# Patient Record
Sex: MALE | Race: WHITE | URBAN - METROPOLITAN AREA
[De-identification: names, ages, dates, MRNs, and addresses within clinical notes are randomized per-mention and may not be internally consistent; named-entity substitution may affect disease eponyms.]

---

## 2020-09-04 ENCOUNTER — WEB ENCOUNTER (OUTPATIENT)
Dept: URBAN - METROPOLITAN AREA CLINIC 86 | Facility: CLINIC | Age: 60
End: 2020-09-04

## 2020-09-28 ENCOUNTER — OFFICE VISIT (OUTPATIENT)
Dept: URBAN - METROPOLITAN AREA CLINIC 98 | Facility: CLINIC | Age: 60
End: 2020-09-28

## 2020-10-12 ENCOUNTER — LAB OUTSIDE AN ENCOUNTER (OUTPATIENT)
Dept: URBAN - METROPOLITAN AREA CLINIC 98 | Facility: CLINIC | Age: 60
End: 2020-10-12

## 2020-10-13 LAB
A/G RATIO: 2.2
ALBUMIN: 4.7
ALKALINE PHOSPHATASE: 50
ALT (SGPT): 36
AST (SGOT): 35
BASO (ABSOLUTE): 0.1
BASOS: 1
BILIRUBIN, TOTAL: 0.7
BUN/CREATININE RATIO: 14
BUN: 17
CALCIUM: 9.8
CARBON DIOXIDE, TOTAL: 25
CHLORIDE: 102
CREATININE: 1.21
EGFR IF AFRICN AM: 74
EGFR IF NONAFRICN AM: 64
EOS (ABSOLUTE): 0.2
EOS: 3
GGT: 55
GLOBULIN, TOTAL: 2.1
GLUCOSE: 108
HEMATOCRIT: 42.8
HEMATOLOGY COMMENTS:: (no result)
HEMOGLOBIN: 14.8
IMMATURE CELLS: (no result)
IMMATURE GRANS (ABS): 0
IMMATURE GRANULOCYTES: 0
INR: 1
LYMPHS (ABSOLUTE): 2.2
LYMPHS: 33
MCH: 31.4
MCHC: 34.6
MCV: 91
MONOCYTES(ABSOLUTE): 0.7
MONOCYTES: 10
NEUTROPHILS (ABSOLUTE): 3.6
NEUTROPHILS: 53
NRBC: (no result)
PLATELETS: 206
POTASSIUM: 4.9
PROTEIN, TOTAL: 6.8
PROTHROMBIN TIME: 10.7
RBC: 4.72
RDW: 11.9
SODIUM: 139
WBC: 6.9

## 2020-10-19 ENCOUNTER — OFFICE VISIT (OUTPATIENT)
Dept: URBAN - METROPOLITAN AREA CLINIC 98 | Facility: CLINIC | Age: 60
End: 2020-10-19

## 2020-10-27 ENCOUNTER — OFFICE VISIT (OUTPATIENT)
Dept: URBAN - METROPOLITAN AREA CLINIC 98 | Facility: CLINIC | Age: 60
End: 2020-10-27
Payer: COMMERCIAL

## 2020-10-27 DIAGNOSIS — K70.0 ALCOHOL INDUCED FATTY LIVER: ICD-10-CM

## 2020-10-27 PROCEDURE — G9903 PT SCRN TBCO ID AS NON USER: HCPCS | Performed by: INTERNAL MEDICINE

## 2020-10-27 PROCEDURE — G8482 FLU IMMUNIZE ORDER/ADMIN: HCPCS | Performed by: INTERNAL MEDICINE

## 2020-10-27 PROCEDURE — G8420 CALC BMI NORM PARAMETERS: HCPCS | Performed by: INTERNAL MEDICINE

## 2020-10-27 PROCEDURE — G8427 DOCREV CUR MEDS BY ELIG CLIN: HCPCS | Performed by: INTERNAL MEDICINE

## 2020-10-27 PROCEDURE — 3017F COLORECTAL CA SCREEN DOC REV: CPT | Performed by: INTERNAL MEDICINE

## 2020-10-27 PROCEDURE — 99213 OFFICE O/P EST LOW 20 MIN: CPT | Performed by: INTERNAL MEDICINE

## 2020-10-27 RX ORDER — DICLOFENAC SODIUM 75 MG/1
TAKE 1 TABLET (75 MG) BY ORAL ROUTE 2 TIMES PER DAY TABLET, DELAYED RELEASE ORAL 2
Qty: 0 | Refills: 0 | Status: ACTIVE | COMMUNITY
Start: 1900-01-01

## 2020-10-27 RX ORDER — AZILSARTAN KAMEDOXOMIL 80 MG/1
TAKE 1 TABLET (80 MG) BY ORAL ROUTE ONCE DAILY TABLET ORAL 1
Qty: 0 | Refills: 0 | Status: ACTIVE | COMMUNITY
Start: 1900-01-01

## 2020-10-27 RX ORDER — TRIAMCINOLONE ACETONIDE 55 UG/1
APPLY 2 SPRAYS BY NASAL ROUTE SPRAY, METERED NASAL
Qty: 1 | Refills: 0 | Status: ACTIVE | COMMUNITY
Start: 1900-01-01

## 2020-10-27 RX ORDER — OMEPRAZOLE 40 MG/1
TAKE 1 CAPSULE (40 MG) BY ORAL ROUTE ONCE DAILY BEFORE A MEAL CAPSULE, DELAYED RELEASE PELLETS ORAL 1
Qty: 0 | Refills: 0 | Status: ACTIVE | COMMUNITY
Start: 1900-01-01

## 2020-10-27 RX ORDER — ASPIRIN 81 MG/1
TAKE 1 TABLET (81 MG) BY ORAL ROUTE ONCE DAILY TABLET, COATED ORAL 1
Qty: 0 | Refills: 0 | Status: ACTIVE | COMMUNITY
Start: 1900-01-01

## 2020-10-27 NOTE — HPI-TODAY'S VISIT:
here to follow up - still drinking alcohol - although not as much as previously - 1-2 drinks wine / night  on weeknights - more on weekends.  generally feels well.  did have shoulder surgery but now completely off diclofenac.

## 2021-10-12 ENCOUNTER — OFFICE VISIT (OUTPATIENT)
Dept: URBAN - METROPOLITAN AREA CLINIC 98 | Facility: CLINIC | Age: 61
End: 2021-10-12

## 2021-10-26 ENCOUNTER — OFFICE VISIT (OUTPATIENT)
Dept: URBAN - METROPOLITAN AREA CLINIC 98 | Facility: CLINIC | Age: 61
End: 2021-10-26

## 2021-11-02 ENCOUNTER — TELEPHONE ENCOUNTER (OUTPATIENT)
Dept: URBAN - METROPOLITAN AREA CLINIC 98 | Facility: CLINIC | Age: 61
End: 2021-11-02

## 2021-11-03 LAB
A/G RATIO: 2
ALBUMIN: 4.5
ALKALINE PHOSPHATASE: 51
ALT (SGPT): 44
AST (SGOT): 41
BASO (ABSOLUTE): 0.1
BASOS: 1
BILIRUBIN, TOTAL: 0.4
BUN/CREATININE RATIO: 14
BUN: 17
CALCIUM: 8.8
CARBON DIOXIDE, TOTAL: 25
CHLORIDE: 102
CREATININE: 1.18
EGFR IF AFRICN AM: 75
EGFR IF NONAFRICN AM: 65
EOS (ABSOLUTE): 0.4
EOS: 6
GGT: 72
GLOBULIN, TOTAL: 2.2
GLUCOSE: 96
HEMATOCRIT: 42.1
HEMATOLOGY COMMENTS:: (no result)
HEMOGLOBIN: 14.3
IMMATURE CELLS: (no result)
IMMATURE GRANS (ABS): 0
IMMATURE GRANULOCYTES: 0
LYMPHS (ABSOLUTE): 2
LYMPHS: 30
MCH: 30.8
MCHC: 34
MCV: 91
MONOCYTES(ABSOLUTE): 0.7
MONOCYTES: 11
NEUTROPHILS (ABSOLUTE): 3.5
NEUTROPHILS: 52
NRBC: (no result)
PLATELETS: 222
POTASSIUM: 4.5
PROTEIN, TOTAL: 6.7
RBC: 4.64
RDW: 11.9
SODIUM: 141
WBC: 6.7

## 2021-11-08 ENCOUNTER — OFFICE VISIT (OUTPATIENT)
Dept: URBAN - METROPOLITAN AREA CLINIC 98 | Facility: CLINIC | Age: 61
End: 2021-11-08
Payer: COMMERCIAL

## 2021-11-08 ENCOUNTER — LAB OUTSIDE AN ENCOUNTER (OUTPATIENT)
Dept: URBAN - METROPOLITAN AREA CLINIC 98 | Facility: CLINIC | Age: 61
End: 2021-11-08

## 2021-11-08 ENCOUNTER — WEB ENCOUNTER (OUTPATIENT)
Dept: URBAN - METROPOLITAN AREA CLINIC 98 | Facility: CLINIC | Age: 61
End: 2021-11-08

## 2021-11-08 VITALS
DIASTOLIC BLOOD PRESSURE: 93 MMHG | BODY MASS INDEX: 32.46 KG/M2 | SYSTOLIC BLOOD PRESSURE: 165 MMHG | HEART RATE: 73 BPM | TEMPERATURE: 97 F | HEIGHT: 67 IN | WEIGHT: 206.8 LBS

## 2021-11-08 DIAGNOSIS — K70.0 ALCOHOL INDUCED FATTY LIVER: ICD-10-CM

## 2021-11-08 PROCEDURE — 99213 OFFICE O/P EST LOW 20 MIN: CPT | Performed by: INTERNAL MEDICINE

## 2021-11-08 RX ORDER — ASPIRIN 81 MG/1
TAKE 1 TABLET (81 MG) BY ORAL ROUTE ONCE DAILY TABLET, COATED ORAL 1
Qty: 0 | Refills: 0 | Status: ACTIVE | COMMUNITY
Start: 1900-01-01

## 2021-11-08 RX ORDER — AZILSARTAN KAMEDOXOMIL 80 MG/1
TAKE 1 TABLET (80 MG) BY ORAL ROUTE ONCE DAILY TABLET ORAL 1
Qty: 0 | Refills: 0 | Status: ACTIVE | COMMUNITY
Start: 1900-01-01

## 2021-11-08 RX ORDER — DICLOFENAC SODIUM 75 MG/1
TAKE 1 TABLET (75 MG) BY ORAL ROUTE 2 TIMES PER DAY TABLET, DELAYED RELEASE ORAL 2
Qty: 0 | Refills: 0 | Status: ACTIVE | COMMUNITY
Start: 1900-01-01

## 2021-11-08 RX ORDER — OMEPRAZOLE 40 MG/1
TAKE 1 CAPSULE (40 MG) BY ORAL ROUTE ONCE DAILY BEFORE A MEAL CAPSULE, DELAYED RELEASE PELLETS ORAL 1
Qty: 0 | Refills: 0 | Status: ACTIVE | COMMUNITY
Start: 1900-01-01

## 2021-11-08 RX ORDER — TRIAMCINOLONE ACETONIDE 55 UG/1
APPLY 2 SPRAYS BY NASAL ROUTE SPRAY, METERED NASAL
Qty: 1 | Refills: 0 | Status: ACTIVE | COMMUNITY
Start: 1900-01-01

## 2021-11-08 NOTE — HPI-OTHER HISTORIES
in [place] all summer.  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   no new health issues.   has had high BP. drinking alcohol - a little more this year

## 2022-09-28 ENCOUNTER — WEB ENCOUNTER (OUTPATIENT)
Dept: URBAN - METROPOLITAN AREA CLINIC 98 | Facility: CLINIC | Age: 62
End: 2022-09-28

## 2022-11-04 ENCOUNTER — LAB OUTSIDE AN ENCOUNTER (OUTPATIENT)
Dept: URBAN - METROPOLITAN AREA CLINIC 98 | Facility: CLINIC | Age: 62
End: 2022-11-04

## 2022-11-05 LAB
A/G RATIO: 2.1
ALBUMIN: 4.2
ALKALINE PHOSPHATASE: 56
ALT (SGPT): 86
AST (SGOT): 72
BASO (ABSOLUTE): 0.1
BASOS: 1
BILIRUBIN, TOTAL: 0.4
BUN/CREATININE RATIO: 16
BUN: 17
CALCIUM: 8.7
CARBON DIOXIDE, TOTAL: 20
CHLORIDE: 101
CREATININE: 1.07
EGFR: 77
EOS (ABSOLUTE): 0.5
EOS: 8
FERRITIN, SERUM: 729
GGT: 73
GLOBULIN, TOTAL: 2
GLUCOSE: 104
HEMATOCRIT: 40.3
HEMATOLOGY COMMENTS:: (no result)
HEMOGLOBIN: 13.5
IMMATURE CELLS: (no result)
IMMATURE GRANS (ABS): 0
IMMATURE GRANULOCYTES: 0
IRON BIND.CAP.(TIBC): 281
IRON SATURATION: 39
IRON: 109
LYMPHS (ABSOLUTE): 1.9
LYMPHS: 33
MCH: 30.6
MCHC: 33.5
MCV: 91
MONOCYTES(ABSOLUTE): 0.7
MONOCYTES: 12
NEUTROPHILS (ABSOLUTE): 2.6
NEUTROPHILS: 46
NRBC: (no result)
PLATELETS: 197
POTASSIUM: 4.3
PROTEIN, TOTAL: 6.2
RBC: 4.41
RDW: 11.9
SODIUM: 138
UIBC: 172
WBC: 5.7

## 2022-11-08 ENCOUNTER — LAB OUTSIDE AN ENCOUNTER (OUTPATIENT)
Dept: URBAN - METROPOLITAN AREA CLINIC 98 | Facility: CLINIC | Age: 62
End: 2022-11-08

## 2022-11-11 ENCOUNTER — OFFICE VISIT (OUTPATIENT)
Dept: URBAN - METROPOLITAN AREA CLINIC 98 | Facility: CLINIC | Age: 62
End: 2022-11-11
Payer: COMMERCIAL

## 2022-11-11 VITALS
WEIGHT: 189.4 LBS | HEIGHT: 67 IN | HEART RATE: 71 BPM | BODY MASS INDEX: 29.73 KG/M2 | TEMPERATURE: 97.9 F | DIASTOLIC BLOOD PRESSURE: 85 MMHG | SYSTOLIC BLOOD PRESSURE: 152 MMHG

## 2022-11-11 DIAGNOSIS — K70.0 ALCOHOL INDUCED FATTY LIVER: ICD-10-CM

## 2022-11-11 PROCEDURE — 99213 OFFICE O/P EST LOW 20 MIN: CPT | Performed by: INTERNAL MEDICINE

## 2022-11-11 RX ORDER — AZILSARTAN KAMEDOXOMIL 80 MG/1
TAKE 1 TABLET (80 MG) BY ORAL ROUTE ONCE DAILY TABLET ORAL 1
Qty: 0 | Refills: 0 | Status: ACTIVE | COMMUNITY
Start: 1900-01-01

## 2022-11-11 RX ORDER — OMEPRAZOLE 40 MG/1
TAKE 1 CAPSULE (40 MG) BY ORAL ROUTE ONCE DAILY BEFORE A MEAL CAPSULE, DELAYED RELEASE PELLETS ORAL 1
Qty: 0 | Refills: 0 | Status: ACTIVE | COMMUNITY
Start: 1900-01-01

## 2022-11-11 RX ORDER — TRIAMCINOLONE ACETONIDE 55 UG/1
APPLY 2 SPRAYS BY NASAL ROUTE SPRAY, METERED NASAL
Qty: 1 | Refills: 0 | Status: ACTIVE | COMMUNITY
Start: 1900-01-01

## 2022-11-11 RX ORDER — TIZANIDINE 4 MG/1
1 TABLET AS NEEDED TABLET ORAL THREE TIMES A DAY
Status: ACTIVE | COMMUNITY

## 2022-11-11 RX ORDER — ASPIRIN 81 MG/1
TAKE 1 TABLET (81 MG) BY ORAL ROUTE ONCE DAILY TABLET, COATED ORAL 1
Qty: 0 | Refills: 0 | Status: ACTIVE | COMMUNITY
Start: 1900-01-01

## 2022-11-11 RX ORDER — DICLOFENAC SODIUM 75 MG/1
TAKE 1 TABLET (75 MG) BY ORAL ROUTE 2 TIMES PER DAY TABLET, DELAYED RELEASE ORAL 2
Qty: 0 | Refills: 0 | Status: ON HOLD | COMMUNITY
Start: 1900-01-01
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
## 2022-12-07 ENCOUNTER — WEB ENCOUNTER (OUTPATIENT)
Dept: URBAN - METROPOLITAN AREA CLINIC 98 | Facility: CLINIC | Age: 62
End: 2022-12-07

## 2022-12-08 ENCOUNTER — TELEPHONE ENCOUNTER (OUTPATIENT)
Dept: URBAN - METROPOLITAN AREA CLINIC 98 | Facility: CLINIC | Age: 62
End: 2022-12-08

## 2023-02-05 ENCOUNTER — WEB ENCOUNTER (OUTPATIENT)
Dept: URBAN - METROPOLITAN AREA CLINIC 98 | Facility: CLINIC | Age: 63
End: 2023-02-05

## 2023-05-03 ENCOUNTER — LAB OUTSIDE AN ENCOUNTER (OUTPATIENT)
Dept: URBAN - METROPOLITAN AREA CLINIC 98 | Facility: CLINIC | Age: 63
End: 2023-05-03

## 2023-05-08 LAB
A/G RATIO: 2.4
ALBUMIN: 4.5
ALKALINE PHOSPHATASE: 48
ALT (SGPT): 47
AST (SGOT): 46
BASO (ABSOLUTE): 0.1
BASOS: 1
BILIRUBIN, TOTAL: 0.7
BUN/CREATININE RATIO: 16
BUN: 16
CALCIUM: 9.8
CARBON DIOXIDE, TOTAL: 25
CHLORIDE: 99
CREATININE: 1.01
EGFR: 83
ELF(TM) SCORE: 10.19
EOS (ABSOLUTE): 0.3
EOS: 5
FERRITIN, SERUM: 447
GGT: 56
GLOBULIN, TOTAL: 1.9
GLUCOSE: 103
HEMATOCRIT: 44.4
HEMATOLOGY COMMENTS:: (no result)
HEMOGLOBIN: 15
IMMATURE CELLS: (no result)
IMMATURE GRANS (ABS): 0
IMMATURE GRANULOCYTES: 0
IRON BIND.CAP.(TIBC): 288
IRON SATURATION: 40
IRON: 115
LYMPHS (ABSOLUTE): 2.2
LYMPHS: 37
MCH: 31.2
MCHC: 33.8
MCV: 92
MONOCYTES(ABSOLUTE): 0.6
MONOCYTES: 10
NEUTROPHILS (ABSOLUTE): 2.7
NEUTROPHILS: 47
NRBC: (no result)
PLATELETS: 193
POTASSIUM: 5.1
PROTEIN, TOTAL: 6.4
RBC: 4.81
RDW: 12.1
SODIUM: 137
UIBC: 173
WBC: 5.9

## 2023-05-11 ENCOUNTER — LAB OUTSIDE AN ENCOUNTER (OUTPATIENT)
Dept: URBAN - METROPOLITAN AREA CLINIC 98 | Facility: CLINIC | Age: 63
End: 2023-05-11

## 2023-05-11 ENCOUNTER — OFFICE VISIT (OUTPATIENT)
Dept: URBAN - METROPOLITAN AREA CLINIC 98 | Facility: CLINIC | Age: 63
End: 2023-05-11
Payer: COMMERCIAL

## 2023-05-11 VITALS
BODY MASS INDEX: 31.67 KG/M2 | HEART RATE: 73 BPM | DIASTOLIC BLOOD PRESSURE: 88 MMHG | TEMPERATURE: 97.1 F | SYSTOLIC BLOOD PRESSURE: 141 MMHG | WEIGHT: 201.8 LBS | HEIGHT: 67 IN

## 2023-05-11 DIAGNOSIS — K70.0 ALCOHOL INDUCED FATTY LIVER: ICD-10-CM

## 2023-05-11 PROCEDURE — 99214 OFFICE O/P EST MOD 30 MIN: CPT | Performed by: INTERNAL MEDICINE

## 2023-05-11 RX ORDER — OMEPRAZOLE 40 MG/1
TAKE 1 CAPSULE (40 MG) BY ORAL ROUTE ONCE DAILY BEFORE A MEAL CAPSULE, DELAYED RELEASE PELLETS ORAL 1
Qty: 0 | Refills: 0 | Status: ACTIVE | COMMUNITY
Start: 1900-01-01

## 2023-05-11 RX ORDER — DICLOFENAC SODIUM 75 MG/1
TAKE 1 TABLET (75 MG) BY ORAL ROUTE 2 TIMES PER DAY TABLET, DELAYED RELEASE ORAL 2
Qty: 0 | Refills: 0 | Status: ON HOLD | COMMUNITY
Start: 1900-01-01

## 2023-05-11 RX ORDER — TRIAMCINOLONE ACETONIDE 55 UG/1
APPLY 2 SPRAYS BY NASAL ROUTE SPRAY, METERED NASAL
Qty: 1 | Refills: 0 | Status: ACTIVE | COMMUNITY
Start: 1900-01-01

## 2023-05-11 RX ORDER — AZILSARTAN KAMEDOXOMIL 80 MG/1
TAKE 1 TABLET (80 MG) BY ORAL ROUTE ONCE DAILY TABLET ORAL 1
Qty: 0 | Refills: 0 | Status: ACTIVE | COMMUNITY
Start: 1900-01-01

## 2023-05-11 RX ORDER — ASPIRIN 81 MG/1
TAKE 1 TABLET (81 MG) BY ORAL ROUTE ONCE DAILY TABLET, COATED ORAL 1
Qty: 0 | Refills: 0 | Status: ACTIVE | COMMUNITY
Start: 1900-01-01

## 2023-05-11 RX ORDER — TIZANIDINE 4 MG/1
1 TABLET AS NEEDED TABLET ORAL THREE TIMES A DAY
Status: ACTIVE | COMMUNITY

## 2023-05-11 NOTE — HPI-TODAY'S VISIT:
Retired  planning to go to [email or web address]  going up to his house to [place]  feels ok but did resume alcohol use although he has cut down overall on the consumption

## 2023-10-23 ENCOUNTER — TELEPHONE ENCOUNTER (OUTPATIENT)
Dept: URBAN - METROPOLITAN AREA CLINIC 98 | Facility: CLINIC | Age: 63
End: 2023-10-23

## 2023-10-23 ENCOUNTER — WEB ENCOUNTER (OUTPATIENT)
Dept: URBAN - METROPOLITAN AREA CLINIC 98 | Facility: CLINIC | Age: 63
End: 2023-10-23

## 2023-10-23 ENCOUNTER — LAB OUTSIDE AN ENCOUNTER (OUTPATIENT)
Dept: URBAN - METROPOLITAN AREA CLINIC 98 | Facility: CLINIC | Age: 63
End: 2023-10-23

## 2023-11-02 ENCOUNTER — TELEPHONE ENCOUNTER (OUTPATIENT)
Dept: URBAN - METROPOLITAN AREA CLINIC 98 | Facility: CLINIC | Age: 63
End: 2023-11-02

## 2023-11-04 LAB
A/G RATIO: 1.9
ALBUMIN: 4.7
ALKALINE PHOSPHATASE: 46
ALT (SGPT): 38
AST (SGOT): 38
BASO (ABSOLUTE): 0.1
BASOS: 1
BILIRUBIN, TOTAL: 0.5
BUN/CREATININE RATIO: 16
BUN: 21
CALCIUM: 9.3
CARBON DIOXIDE, TOTAL: 26
CHLORIDE: 96
CREATININE: 1.31
EGFR: 60
ELF(TM) SCORE: 11.4
EOS (ABSOLUTE): 0.4
EOS: 6
GGT: 59
GLOBULIN, TOTAL: 2.5
GLUCOSE: 94
HEMATOCRIT: 42.1
HEMATOLOGY COMMENTS:: (no result)
HEMOGLOBIN: 14.3
IMMATURE CELLS: (no result)
IMMATURE GRANS (ABS): 0
IMMATURE GRANULOCYTES: 0
LYMPHS (ABSOLUTE): 2.2
LYMPHS: 35
MCH: 31.6
MCHC: 34
MCV: 93
MONOCYTES(ABSOLUTE): 0.7
MONOCYTES: 12
NEUTROPHILS (ABSOLUTE): 2.9
NEUTROPHILS: 46
NRBC: (no result)
PLATELETS: 210
POTASSIUM: 4.4
PROTEIN, TOTAL: 7.2
RBC: 4.52
RDW: 11.8
SODIUM: 138
WBC: 6.2

## 2023-11-06 ENCOUNTER — OFFICE VISIT (OUTPATIENT)
Dept: URBAN - METROPOLITAN AREA CLINIC 98 | Facility: CLINIC | Age: 63
End: 2023-11-06
Payer: COMMERCIAL

## 2023-11-06 ENCOUNTER — DASHBOARD ENCOUNTERS (OUTPATIENT)
Age: 63
End: 2023-11-06

## 2023-11-06 ENCOUNTER — OFFICE VISIT (OUTPATIENT)
Dept: URBAN - METROPOLITAN AREA CLINIC 98 | Facility: CLINIC | Age: 63
End: 2023-11-06

## 2023-11-06 VITALS
DIASTOLIC BLOOD PRESSURE: 77 MMHG | BODY MASS INDEX: 32.33 KG/M2 | HEART RATE: 62 BPM | WEIGHT: 206 LBS | TEMPERATURE: 97.6 F | HEIGHT: 67 IN | SYSTOLIC BLOOD PRESSURE: 163 MMHG

## 2023-11-06 DIAGNOSIS — K70.0 ALCOHOL INDUCED FATTY LIVER: ICD-10-CM

## 2023-11-06 DIAGNOSIS — N28.9 RENAL INSUFFICIENCY: ICD-10-CM

## 2023-11-06 PROBLEM — 50325005 ALCOHOLIC FATTY LIVER: Status: ACTIVE | Noted: 2020-10-27

## 2023-11-06 PROCEDURE — 99213 OFFICE O/P EST LOW 20 MIN: CPT | Performed by: INTERNAL MEDICINE

## 2023-11-06 RX ORDER — OMEPRAZOLE 40 MG/1
TAKE 1 CAPSULE (40 MG) BY ORAL ROUTE ONCE DAILY BEFORE A MEAL CAPSULE, DELAYED RELEASE PELLETS ORAL 1
Qty: 0 | Refills: 0 | Status: ACTIVE | COMMUNITY
Start: 1900-01-01

## 2023-11-06 RX ORDER — AZILSARTAN KAMEDOXOMIL 80 MG/1
TAKE 1 TABLET (80 MG) BY ORAL ROUTE ONCE DAILY TABLET ORAL 1
Qty: 0 | Refills: 0 | Status: ACTIVE | COMMUNITY
Start: 1900-01-01

## 2023-11-06 RX ORDER — DICLOFENAC SODIUM 75 MG/1
TAKE 1 TABLET (75 MG) BY ORAL ROUTE 2 TIMES PER DAY TABLET, DELAYED RELEASE ORAL 2
Qty: 0 | Refills: 0 | Status: ON HOLD | COMMUNITY
Start: 1900-01-01

## 2023-11-06 RX ORDER — TIZANIDINE 4 MG/1
1 TABLET AS NEEDED TABLET ORAL THREE TIMES A DAY
Status: ACTIVE | COMMUNITY

## 2023-11-06 RX ORDER — ASPIRIN 81 MG/1
TAKE 1 TABLET (81 MG) BY ORAL ROUTE ONCE DAILY TABLET, COATED ORAL 1
Qty: 0 | Refills: 0 | Status: ACTIVE | COMMUNITY
Start: 1900-01-01

## 2023-11-06 RX ORDER — TRIAMCINOLONE ACETONIDE 55 UG/1
APPLY 2 SPRAYS BY NASAL ROUTE SPRAY, METERED NASAL
Qty: 1 | Refills: 0 | Status: ACTIVE | COMMUNITY
Start: 1900-01-01

## 2023-11-06 NOTE — HPI-TODAY'S VISIT:
started hctz 7 months ago (Dr Smalls) drinking a little less overall  feels well  has colonoscopy due 2024 w Dr Vance Brooks

## 2023-11-11 ENCOUNTER — LAB OUTSIDE AN ENCOUNTER (OUTPATIENT)
Dept: URBAN - METROPOLITAN AREA CLINIC 98 | Facility: CLINIC | Age: 63
End: 2023-11-11

## 2024-06-26 ENCOUNTER — WEB ENCOUNTER (OUTPATIENT)
Dept: URBAN - METROPOLITAN AREA CLINIC 35 | Facility: CLINIC | Age: 64
End: 2024-06-26

## 2024-06-27 ENCOUNTER — WEB ENCOUNTER (OUTPATIENT)
Dept: URBAN - METROPOLITAN AREA CLINIC 35 | Facility: CLINIC | Age: 64
End: 2024-06-27

## 2024-09-30 ENCOUNTER — WEB ENCOUNTER (OUTPATIENT)
Dept: URBAN - METROPOLITAN AREA CLINIC 35 | Facility: CLINIC | Age: 64
End: 2024-09-30

## 2024-10-09 ENCOUNTER — OFFICE VISIT (OUTPATIENT)
Dept: URBAN - METROPOLITAN AREA SURGERY CENTER 8 | Facility: SURGERY CENTER | Age: 64
End: 2024-10-09

## 2024-10-16 ENCOUNTER — WEB ENCOUNTER (OUTPATIENT)
Dept: URBAN - METROPOLITAN AREA CLINIC 98 | Facility: CLINIC | Age: 64
End: 2024-10-16

## 2024-10-16 ENCOUNTER — WEB ENCOUNTER (OUTPATIENT)
Dept: URBAN - METROPOLITAN AREA CLINIC 35 | Facility: CLINIC | Age: 64
End: 2024-10-16

## 2024-10-16 ENCOUNTER — TELEPHONE ENCOUNTER (OUTPATIENT)
Dept: URBAN - METROPOLITAN AREA SURGERY CENTER 8 | Facility: SURGERY CENTER | Age: 64
End: 2024-10-16

## 2024-10-17 ENCOUNTER — WEB ENCOUNTER (OUTPATIENT)
Dept: URBAN - METROPOLITAN AREA CLINIC 98 | Facility: CLINIC | Age: 64
End: 2024-10-17

## 2024-10-18 ENCOUNTER — OFFICE VISIT (OUTPATIENT)
Dept: URBAN - METROPOLITAN AREA SURGERY CENTER 8 | Facility: SURGERY CENTER | Age: 64
End: 2024-10-18

## 2024-10-21 ENCOUNTER — WEB ENCOUNTER (OUTPATIENT)
Dept: URBAN - METROPOLITAN AREA CLINIC 98 | Facility: CLINIC | Age: 64
End: 2024-10-21

## 2024-10-21 ENCOUNTER — TELEPHONE ENCOUNTER (OUTPATIENT)
Dept: URBAN - METROPOLITAN AREA CLINIC 98 | Facility: CLINIC | Age: 64
End: 2024-10-21

## 2024-10-22 ENCOUNTER — WEB ENCOUNTER (OUTPATIENT)
Dept: URBAN - METROPOLITAN AREA CLINIC 98 | Facility: CLINIC | Age: 64
End: 2024-10-22

## 2024-11-06 ENCOUNTER — LAB OUTSIDE AN ENCOUNTER (OUTPATIENT)
Dept: URBAN - METROPOLITAN AREA CLINIC 98 | Facility: CLINIC | Age: 64
End: 2024-11-06

## 2024-11-11 ENCOUNTER — OFFICE VISIT (OUTPATIENT)
Dept: URBAN - METROPOLITAN AREA CLINIC 98 | Facility: CLINIC | Age: 64
End: 2024-11-11

## 2024-11-20 ENCOUNTER — OFFICE VISIT (OUTPATIENT)
Dept: URBAN - METROPOLITAN AREA SURGERY CENTER 18 | Facility: SURGERY CENTER | Age: 64
End: 2024-11-20

## 2024-11-20 ENCOUNTER — CLAIMS CREATED FROM THE CLAIM WINDOW (OUTPATIENT)
Dept: URBAN - METROPOLITAN AREA SURGERY CENTER 18 | Facility: SURGERY CENTER | Age: 64
End: 2024-11-20
Payer: MEDICARE

## 2024-11-20 ENCOUNTER — CLAIMS CREATED FROM THE CLAIM WINDOW (OUTPATIENT)
Dept: URBAN - METROPOLITAN AREA CLINIC 4 | Facility: CLINIC | Age: 64
End: 2024-11-20
Payer: MEDICARE

## 2024-11-20 DIAGNOSIS — D12.3 BENIGN NEOPLASM OF TRANSVERSE COLON: ICD-10-CM

## 2024-11-20 DIAGNOSIS — D12.2 BENIGN NEOPLASM OF ASCENDING COLON: ICD-10-CM

## 2024-11-20 DIAGNOSIS — Z09 ENCNTR FOR F/U EXAM AFT TRTMT FOR COND OTH THAN MALIG NEOPLM: ICD-10-CM

## 2024-11-20 DIAGNOSIS — Z86.0100 PERSONAL HISTORY OF COLONIC POLYPS: ICD-10-CM

## 2024-11-20 DIAGNOSIS — D12.2 ADENOMATOUS POLYP OF ASCENDING COLON: ICD-10-CM

## 2024-11-20 DIAGNOSIS — D12.3 ADENOMATOUS POLYP OF TRANSVERSE COLON: ICD-10-CM

## 2024-11-20 PROCEDURE — 88305 TISSUE EXAM BY PATHOLOGIST: CPT | Performed by: PATHOLOGY

## 2024-11-20 PROCEDURE — 00811 ANES LWR INTST NDSC NOS: CPT | Performed by: NURSE ANESTHETIST, CERTIFIED REGISTERED

## 2024-11-20 RX ORDER — AZILSARTAN KAMEDOXOMIL 80 MG/1
TAKE 1 TABLET (80 MG) BY ORAL ROUTE ONCE DAILY TABLET ORAL 1
Qty: 0 | Refills: 0 | Status: ACTIVE | COMMUNITY
Start: 1900-01-01

## 2024-11-20 RX ORDER — OMEPRAZOLE 40 MG/1
TAKE 1 CAPSULE (40 MG) BY ORAL ROUTE ONCE DAILY BEFORE A MEAL CAPSULE, DELAYED RELEASE PELLETS ORAL 1
Qty: 0 | Refills: 0 | Status: ACTIVE | COMMUNITY
Start: 1900-01-01

## 2024-11-20 RX ORDER — TIZANIDINE 4 MG/1
1 TABLET AS NEEDED TABLET ORAL THREE TIMES A DAY
Status: ACTIVE | COMMUNITY

## 2024-11-20 RX ORDER — DICLOFENAC SODIUM 75 MG/1
TAKE 1 TABLET (75 MG) BY ORAL ROUTE 2 TIMES PER DAY TABLET, DELAYED RELEASE ORAL 2
Qty: 0 | Refills: 0 | Status: ON HOLD | COMMUNITY
Start: 1900-01-01

## 2024-11-20 RX ORDER — ASPIRIN 81 MG/1
TAKE 1 TABLET (81 MG) BY ORAL ROUTE ONCE DAILY TABLET, COATED ORAL 1
Qty: 0 | Refills: 0 | Status: ACTIVE | COMMUNITY
Start: 1900-01-01

## 2024-11-20 RX ORDER — TRIAMCINOLONE ACETONIDE 55 UG/1
APPLY 2 SPRAYS BY NASAL ROUTE SPRAY, METERED NASAL
Qty: 1 | Refills: 0 | Status: ACTIVE | COMMUNITY
Start: 1900-01-01

## 2024-11-21 ENCOUNTER — WEB ENCOUNTER (OUTPATIENT)
Dept: URBAN - METROPOLITAN AREA CLINIC 98 | Facility: CLINIC | Age: 64
End: 2024-11-21

## 2024-12-02 ENCOUNTER — OFFICE VISIT (OUTPATIENT)
Dept: URBAN - METROPOLITAN AREA CLINIC 98 | Facility: CLINIC | Age: 64
End: 2024-12-02

## 2025-03-25 ENCOUNTER — WEB ENCOUNTER (OUTPATIENT)
Dept: URBAN - METROPOLITAN AREA CLINIC 98 | Facility: CLINIC | Age: 65
End: 2025-03-25